# Patient Record
Sex: FEMALE | Race: WHITE | URBAN - METROPOLITAN AREA
[De-identification: names, ages, dates, MRNs, and addresses within clinical notes are randomized per-mention and may not be internally consistent; named-entity substitution may affect disease eponyms.]

---

## 2017-07-05 ENCOUNTER — APPOINTMENT (RX ONLY)
Dept: URBAN - METROPOLITAN AREA CLINIC 76 | Facility: CLINIC | Age: 67
Setting detail: DERMATOLOGY
End: 2017-07-05

## 2017-07-05 DIAGNOSIS — L81.4 OTHER MELANIN HYPERPIGMENTATION: ICD-10-CM

## 2017-07-05 DIAGNOSIS — L82.0 INFLAMED SEBORRHEIC KERATOSIS: ICD-10-CM

## 2017-07-05 DIAGNOSIS — L70.0 ACNE VULGARIS: ICD-10-CM

## 2017-07-05 DIAGNOSIS — D18.0 HEMANGIOMA: ICD-10-CM

## 2017-07-05 DIAGNOSIS — Z71.89 OTHER SPECIFIED COUNSELING: ICD-10-CM

## 2017-07-05 DIAGNOSIS — L82.1 OTHER SEBORRHEIC KERATOSIS: ICD-10-CM

## 2017-07-05 DIAGNOSIS — D22 MELANOCYTIC NEVI: ICD-10-CM

## 2017-07-05 PROBLEM — D22.5 MELANOCYTIC NEVI OF TRUNK: Status: ACTIVE | Noted: 2017-07-05

## 2017-07-05 PROBLEM — E05.90 THYROTOXICOSIS, UNSPECIFIED WITHOUT THYROTOXIC CRISIS OR STORM: Status: ACTIVE | Noted: 2017-07-05

## 2017-07-05 PROBLEM — D18.01 HEMANGIOMA OF SKIN AND SUBCUTANEOUS TISSUE: Status: ACTIVE | Noted: 2017-07-05

## 2017-07-05 PROCEDURE — 17110 DESTRUCTION B9 LES UP TO 14: CPT

## 2017-07-05 PROCEDURE — ? TREATMENT REGIMEN

## 2017-07-05 PROCEDURE — ? LIQUID NITROGEN

## 2017-07-05 PROCEDURE — ? IN-HOUSE DISPENSING PHARMACY

## 2017-07-05 PROCEDURE — 99203 OFFICE O/P NEW LOW 30 MIN: CPT | Mod: 25

## 2017-07-05 PROCEDURE — ? COUNSELING

## 2017-07-05 ASSESSMENT — LOCATION DETAILED DESCRIPTION DERM
LOCATION DETAILED: RIGHT SUPERIOR MEDIAL UPPER BACK
LOCATION DETAILED: RIGHT SUPRAPUBIC SKIN
LOCATION DETAILED: LEFT CENTRAL MALAR CHEEK
LOCATION DETAILED: INFERIOR THORACIC SPINE
LOCATION DETAILED: EPIGASTRIC SKIN
LOCATION DETAILED: LEFT ANTERIOR SHOULDER

## 2017-07-05 ASSESSMENT — LOCATION SIMPLE DESCRIPTION DERM
LOCATION SIMPLE: LEFT SHOULDER
LOCATION SIMPLE: UPPER BACK
LOCATION SIMPLE: RIGHT UPPER BACK
LOCATION SIMPLE: GROIN
LOCATION SIMPLE: LEFT CHEEK
LOCATION SIMPLE: ABDOMEN

## 2017-07-05 ASSESSMENT — LOCATION ZONE DERM
LOCATION ZONE: FACE
LOCATION ZONE: ARM
LOCATION ZONE: TRUNK

## 2017-07-05 NOTE — PROCEDURE: TREATMENT REGIMEN
Detail Level: Zone
Discontinue Regimen: spironolactone
Initiate Treatment: topical dapsone and topical tretinoin

## 2017-07-05 NOTE — PROCEDURE: IN-HOUSE DISPENSING PHARMACY
Product 19 Refills: 0
Product 7 Application Directions: Apply to affected area before moisturizer one time a day.
Product 5 Price/Unit (In Dollars): 50.00
Name Of Product 41: Hydroquin 6% Combo Cream - 089479
Product 35 Application Directions: Apply to affected area two times a day.
Name Of Product 31: Ivermec 1% / Met 1% Gel - 567696
Product 77 Unit Type: mg
Render Product Pricing In Note: Yes
Product 4 Price/Unit (In Dollars): 40.00
Product 51 Application Directions: Apply to affected nails daily for 10 months.
Name Of Product 8: Taza 0.1 Cream - 836837
Detail Level: Zone
Product 41 Application Directions: Apply to hyperpigmented areas in the evening after moisturizer.
Product 4 Application Directions: Apply to affected area after moisturizer one time a day.
Name Of Product 5: Adap Combo Cream - 027071
Name Of Product 21: Imiqui 5% / Levo 1% Gel - 197547
Name Of Product 12: Iodoquin / Jose M Combo - 240283
Name Of Product 25: Triamcin 0.1% Ointment - 842734
Product 3 Application Directions: Apply to acne prone area after moisturizer one time a day.
Product 6 Application Directions: Apply to affected area one time a day.
Name Of Product 6: Sod Sulfa 10% / Sulf 2% - 218567
Name Of Product 2: Tret 0.05% Cream - 475385
Product 2 Application Directions: Apply to affected area in the evening after moisturizer. Avoid eyelids.
Name Of Product 42: Kojic Melasma Cream - 981503
Product 2 Units Dispensed: 1
Name Of Product 13: Clob 0.05% Cream - 399403
Name Of Product 8: Taza 0.1 Cream - 231469
Name Of Product 4: Clind / Tret Combo Cream - 873878
Name Of Product 13: Clob 0.05% Cream - 765444
Name Of Product 14: Dermatitis Topical Foam - 387214
Name Of Product 7: Acne Gel w/ Dap - 317162
Product 2 Amount/Unit (Numbers Only): 30
Name Of Product 35: Tacro 0.1% Ointment - 571582
Name Of Product 7: Acne Gel w/ Dap - 743337
Name Of Product 41: Hydroquin 6% Combo Cream - 489750
Name Of Product 11: Clob 0.05% Solution - 122140
Name Of Product 5: Adap Combo Cream - 974139
Name Of Product 3: Syed / Clind Combo - 536771
Product 7 Refills: 11
Product 1 Application Directions: Use as face or body wash daily.
Name Of Product 14: Dermatitis Topical Foam - 419655
Name Of Product 35: Tacro 0.1% Ointment - 702854
Product 21 Application Directions: Apply to affected area in the evening or every other evening.
Name Of Product 12: Iodoquin / Jose M Combo - 590081
Name Of Product 11: Clob 0.05% Solution - 723006
Name Of Product 21: Imiqui 5% / Levo 1% Gel - 470608
Name Of Product 51: Anti- Fungal Nail Solution - 295783
Name Of Product 2: Tret 0.05% Cream - 588903
Name Of Product 6: Sod Sulfa 10% / Sulf 2% - 331117
Name Of Product 1: Acne Body Wash - 350585
Name Of Product 51: Anti- Fungal Nail Solution - 721482
Product 7 Amount/Unit (Numbers Only): 30
Name Of Product 31: Ivermec 1% / Met 1% Gel - 161728
Product 2 Refills: 11
Name Of Product 3: Syed / Clind Combo - 752706
Name Of Product 1: Acne Body Wash - 980731
Name Of Product 4: Clind / Tret Combo Cream - 016120
Name Of Product 25: Triamcin 0.1% Ointment - 453745
Name Of Product 42: Kojic Melasma Cream - 572729

## 2017-07-05 NOTE — PROCEDURE: LIQUID NITROGEN
Post-Care Instructions: I reviewed with the patient in detail post-care instructions. Patient is to wear sunprotection, and avoid picking at any of the treated lesions. Pt may apply Vaseline to crusted or scabbing areas.
Include Z78.9 (Other Specified Conditions Influencing Health Status) As An Associated Diagnosis?: No
Consent: The patient's consent was obtained including but not limited to risks of crusting, scabbing, blistering, scarring, darker or lighter pigmentary change, recurrence, incomplete removal and infection.
Medical Necessity Information: It is in your best interest to select a reason for this procedure from the list below. All of these items fulfill various CMS LCD requirements except the new and changing color options.
Medical Necessity Clause: This procedure was medically necessary because the lesions that were treated were:
Detail Level: Detailed